# Patient Record
Sex: FEMALE | Race: WHITE | Employment: OTHER | URBAN - METROPOLITAN AREA
[De-identification: names, ages, dates, MRNs, and addresses within clinical notes are randomized per-mention and may not be internally consistent; named-entity substitution may affect disease eponyms.]

---

## 2018-12-06 PROCEDURE — 88175 CYTOPATH C/V AUTO FLUID REDO: CPT | Performed by: OBSTETRICS & GYNECOLOGY

## 2018-12-06 PROCEDURE — 87624 HPV HI-RISK TYP POOLED RSLT: CPT | Performed by: OBSTETRICS & GYNECOLOGY

## 2020-11-10 ENCOUNTER — HOSPITAL ENCOUNTER (OUTPATIENT)
Age: 45
Discharge: HOME | End: 2020-11-10
Payer: COMMERCIAL

## 2020-11-10 VITALS
TEMPERATURE: 98 F | RESPIRATION RATE: 17 BRPM | SYSTOLIC BLOOD PRESSURE: 123 MMHG | HEART RATE: 88 BPM | WEIGHT: 148 LBS | BODY MASS INDEX: 23 KG/M2 | DIASTOLIC BLOOD PRESSURE: 75 MMHG | OXYGEN SATURATION: 100 %

## 2020-11-10 DIAGNOSIS — J02.9 VIRAL PHARYNGITIS: ICD-10-CM

## 2020-11-10 DIAGNOSIS — R06.02 SHORTNESS OF BREATH: ICD-10-CM

## 2020-11-10 DIAGNOSIS — Z20.822 ENCOUNTER FOR SCREENING LABORATORY TESTING FOR COVID-19 VIRUS: Primary | ICD-10-CM

## 2020-11-10 PROCEDURE — 99202 OFFICE O/P NEW SF 15 MIN: CPT | Performed by: NURSE PRACTITIONER

## 2020-11-10 PROCEDURE — 87880 STREP A ASSAY W/OPTIC: CPT | Performed by: NURSE PRACTITIONER

## 2020-11-10 NOTE — ED PROVIDER NOTES
Patient Seen in: [facility]      History   Patient presents with:  Difficulty Breathing  Back Pain  Testing  Headache  Sore Throat    Stated Complaint: testing    HPI    This is a 44-year-old female with past medical history of anxiety prese reviewed. Constitutional:       Appearance: Normal appearance. HENT:      Head: Normocephalic.       Right Ear: External ear normal.      Left Ear: External ear normal.      Nose: Nose normal.      Mouth/Throat:      Mouth: Mucous membranes are moist. develops any worsening shortness of breath or something changes with how she feels that she needs to go to the ER for further evaluation.   Did discuss repeat chest x-ray given that patient just had a chest x-ray 1 week ago and after discussing radiation ex

## 2020-11-10 NOTE — ED INITIAL ASSESSMENT (HPI)
Pt requesting covid testing , recent exposure , 11/6/20, pt c/o sob , on/off sore throat headache , back pain started 11/1/20.

## 2021-09-22 ENCOUNTER — HOSPITAL ENCOUNTER (OUTPATIENT)
Age: 46
Discharge: HOME | End: 2021-09-22
Payer: COMMERCIAL

## 2021-09-22 VITALS
HEART RATE: 95 BPM | DIASTOLIC BLOOD PRESSURE: 65 MMHG | WEIGHT: 143 LBS | TEMPERATURE: 97 F | OXYGEN SATURATION: 100 % | HEIGHT: 67 IN | BODY MASS INDEX: 22.44 KG/M2 | RESPIRATION RATE: 16 BRPM | SYSTOLIC BLOOD PRESSURE: 104 MMHG

## 2021-09-22 DIAGNOSIS — Z91.89 AT INCREASED RISK OF EXPOSURE TO COVID-19 VIRUS: Primary | ICD-10-CM

## 2021-09-22 LAB — SARS-COV-2 RNA RESP QL NAA+PROBE: NOT DETECTED

## 2021-09-22 PROCEDURE — 99212 OFFICE O/P EST SF 10 MIN: CPT | Performed by: NURSE PRACTITIONER

## 2021-09-22 PROCEDURE — U0002 COVID-19 LAB TEST NON-CDC: HCPCS | Performed by: NURSE PRACTITIONER

## 2021-09-22 RX ORDER — CLONAZEPAM 1 MG/1
1 TABLET ORAL NIGHTLY
COMMUNITY
Start: 2021-08-09

## 2021-09-22 NOTE — ED INITIAL ASSESSMENT (HPI)
Pt states she was exposed to someone who doesn't feel well, but tested negative to covid. States she has anxiety and is reporting chest tightness which she gets often.

## 2021-09-22 NOTE — ED PROVIDER NOTES
atient Seen in: [facility]    History   Patient presents with:  Testing    Stated Complaint: covid test    HPI    Laverna Prasanna Carlson Mention is a 42year old female who presents to immediate care requesting COVID-19 test. states patient was rec 22.40 kg/m²     PULSE OX within normal limits on room air as interpreted by this provider. Constitutional: Well-developed, well-nourished, no acute distress. Well-hydrated. Appears nontoxic. Patient smiling and playful. Head: Normocephalic/atraumatic. virus  (primary encounter diagnosis)    Disposition:  Discharge    Follow-up:  Brenna Chaves [facility] [address] [facility] [id number] [phone]            Medications Prescribed:  Current Discharge Medication List